# Patient Record
Sex: MALE | Race: OTHER | Employment: OTHER | URBAN - METROPOLITAN AREA
[De-identification: names, ages, dates, MRNs, and addresses within clinical notes are randomized per-mention and may not be internally consistent; named-entity substitution may affect disease eponyms.]

---

## 2019-05-07 ENCOUNTER — OFFICE VISIT (OUTPATIENT)
Dept: FAMILY MEDICINE CLINIC | Age: 74
End: 2019-05-07
Payer: MEDICAID

## 2019-05-07 VITALS
HEIGHT: 69 IN | SYSTOLIC BLOOD PRESSURE: 134 MMHG | WEIGHT: 189.4 LBS | OXYGEN SATURATION: 97 % | HEART RATE: 74 BPM | DIASTOLIC BLOOD PRESSURE: 70 MMHG | RESPIRATION RATE: 14 BRPM | BODY MASS INDEX: 28.05 KG/M2

## 2019-05-07 DIAGNOSIS — G89.29 CHRONIC PAIN OF BOTH KNEES: Primary | ICD-10-CM

## 2019-05-07 DIAGNOSIS — M25.562 CHRONIC PAIN OF BOTH KNEES: Primary | ICD-10-CM

## 2019-05-07 DIAGNOSIS — M25.561 CHRONIC PAIN OF BOTH KNEES: Primary | ICD-10-CM

## 2019-05-07 DIAGNOSIS — N40.0 BENIGN PROSTATIC HYPERPLASIA WITHOUT LOWER URINARY TRACT SYMPTOMS: ICD-10-CM

## 2019-05-07 DIAGNOSIS — R53.83 FATIGUE, UNSPECIFIED TYPE: ICD-10-CM

## 2019-05-07 PROCEDURE — 99204 OFFICE O/P NEW MOD 45 MIN: CPT | Performed by: FAMILY MEDICINE

## 2019-05-07 PROCEDURE — 1036F TOBACCO NON-USER: CPT | Performed by: FAMILY MEDICINE

## 2019-05-07 PROCEDURE — 1123F ACP DISCUSS/DSCN MKR DOCD: CPT | Performed by: FAMILY MEDICINE

## 2019-05-07 PROCEDURE — G8419 CALC BMI OUT NRM PARAM NOF/U: HCPCS | Performed by: FAMILY MEDICINE

## 2019-05-07 PROCEDURE — 3017F COLORECTAL CA SCREEN DOC REV: CPT | Performed by: FAMILY MEDICINE

## 2019-05-07 PROCEDURE — 4040F PNEUMOC VAC/ADMIN/RCVD: CPT | Performed by: FAMILY MEDICINE

## 2019-05-07 PROCEDURE — G8427 DOCREV CUR MEDS BY ELIG CLIN: HCPCS | Performed by: FAMILY MEDICINE

## 2019-05-07 RX ORDER — MELOXICAM 15 MG/1
15 TABLET ORAL DAILY
Qty: 30 TABLET | Refills: 3 | Status: SHIPPED | OUTPATIENT
Start: 2019-05-07

## 2019-05-07 RX ORDER — TAMSULOSIN HYDROCHLORIDE 0.4 MG/1
0.4 CAPSULE ORAL
COMMUNITY
Start: 2019-04-29

## 2019-05-07 RX ORDER — CLONAZEPAM 2 MG/1
2 TABLET ORAL
COMMUNITY
Start: 2019-04-29

## 2019-05-07 RX ORDER — METHYLPREDNISOLONE 4 MG
TABLET, DOSE PACK ORAL
COMMUNITY
Start: 2019-04-29

## 2019-05-07 RX ORDER — IVERMECTIN 10 MG/G
CREAM TOPICAL
COMMUNITY

## 2019-05-07 ASSESSMENT — PATIENT HEALTH QUESTIONNAIRE - PHQ9
SUM OF ALL RESPONSES TO PHQ9 QUESTIONS 1 & 2: 1
1. LITTLE INTEREST OR PLEASURE IN DOING THINGS: 0
SUM OF ALL RESPONSES TO PHQ QUESTIONS 1-9: 1
SUM OF ALL RESPONSES TO PHQ QUESTIONS 1-9: 1
2. FEELING DOWN, DEPRESSED OR HOPELESS: 1

## 2019-05-07 ASSESSMENT — ENCOUNTER SYMPTOMS
EYES NEGATIVE: 1
RESPIRATORY NEGATIVE: 1
GASTROINTESTINAL NEGATIVE: 1

## 2019-05-07 NOTE — PROGRESS NOTES
SUBJECTIVE:  Patient ID: Ramila Buckner is a 68 y.o. y.o. male     HPI   Pt is here to establish, speaks  is present along with his daughter  Knee Pain: Patient presents with knee pain involving the  bilateral knee. Onset of the symptoms was several months ago. Inciting event: none known. Current symptoms include crepitus sensation, giving out and stiffness. Pain is aggravated by any weight bearing. Patient has had no prior knee problems. Evaluation to date: plain films: in [facility] . Treatment to date: avoidance of offending activity. And varicose vain  Fatigue: Patient complains of fatigue. Symptoms began several months ago. Sentinal symptom the patient feels fatigue began with: none. Symptoms of his fatigue have been feelings of depression and general malaise. Patient describes the following psychologic symptoms: none. Patient denies fever, significant change in weight, exercise intolerance, unusual rashes, cold intolerance, constipation and change in hair texture. and witnessed or suspected sleep apnea. Symptoms have been intermittent. Severity has been mild. Previous visits for this problem: none. Was in [facility] was told he has abnormal PSA and was put on Flomax feels better on it    Past Medical History:   Diagnosis Date    Chronic back pain     GERD (gastroesophageal reflux disease)       Past Surgical History:   Procedure Laterality Date    GALLBLADDER SURGERY      HEMORRHOID SURGERY      TONSILLECTOMY      UMBILICAL HERNIA REPAIR       History reviewed. No pertinent family history.   Social History     Socioeconomic History    Marital status:      Spouse name: None    Number of children: None    Years of education: None    Highest education level: None   Occupational History    None   Social Needs    Financial resource strain: None    Food insecurity:     Worry: None     Inability: None    Transportation needs:     Medical: None     Non-medical: None   Tobacco Use icterus. Neck: Normal range of motion. Neck supple. No JVD present. Carotid bruit is not present. No thyromegaly present. Cardiovascular: Normal rate, regular rhythm, normal heart sounds and intact distal pulses. No murmur heard. Pulmonary/Chest: Effort normal and breath sounds normal. No respiratory distress. He has no wheezes. He has no rales. He exhibits no tenderness. Abdominal: Soft. Bowel sounds are normal. He exhibits no distension and no mass. There is no tenderness. There is no rebound and no guarding. Musculoskeletal: He exhibits no edema. Right knee: He exhibits decreased range of motion. Tenderness found. Medial joint line and lateral joint line tenderness noted. Neurological: He is alert and oriented to person, place, and time. Skin: No rash noted. Vitals reviewed. ASSESSMENT:   Diagnosis Orders   1. Chronic pain of both knees  [facility]   2. Benign prostatic hyperplasia without lower urinary tract symptoms  PSA PROSTATIC SPECIFIC ANTIGEN   3.  Fatigue, unspecified type  CBC    Basic Metabolic Panel    TSH without Reflex         PLAN:  See orders   Discussed possible depression, may need to be treated recommend counseling daughter will work on it

## 2019-05-07 NOTE — PATIENT INSTRUCTIONS
Patient Education        Knee Arthritis: Exercises  Your Care Instructions  Here are some examples of exercises for knee arthritis. Start each exercise slowly. Ease off the exercise if you start to have pain. Your doctor or physical therapist will tell you when you can start these exercises and which ones will work best for you. How to do the exercises  Knee flexion with heel slide    1. Lie on your back with your knees bent. 2. Slide your heel back by bending your affected knee as far as you can. Then hook your other foot around your ankle to help pull your heel even farther back. 3. Hold for about 6 seconds, then rest for up to 10 seconds. 4. Repeat 8 to 12 times. 5. Switch legs and repeat steps 1 through 4, even if only one knee is sore. Quad sets    1. Sit with your affected leg straight and supported on the floor or a firm bed. Place a small, rolled-up towel under your knee. Your other leg should be bent, with that foot flat on the floor. 2. Tighten the thigh muscles of your affected leg by pressing the back of your knee down into the towel. 3. Hold for about 6 seconds, then rest for up to 10 seconds. 4. Repeat 8 to 12 times. 5. Switch legs and repeat steps 1 through 4, even if only one knee is sore. Straight-leg raises to the front    1. Lie on your back with your good knee bent so that your foot rests flat on the floor. Your affected leg should be straight. Make sure that your low back has a normal curve. You should be able to slip your hand in between the floor and the small of your back, with your palm touching the floor and your back touching the back of your hand. 2. Tighten the thigh muscles in your affected leg by pressing the back of your knee flat down to the floor. Hold your knee straight. 3. Keeping the thigh muscles tight and your leg straight, lift your affected leg up so that your heel is about 12 inches off the floor. Hold for about 6 seconds, then lower slowly.   4. Relax for up to 10 seconds between repetitions. 5. Repeat 8 to 12 times. 6. Switch legs and repeat steps 1 through 5, even if only one knee is sore. Active knee flexion    1. Lie on your stomach with your knees straight. If your kneecap is uncomfortable, roll up a washcloth and put it under your leg just above your kneecap. 2. Lift the foot of your affected leg by bending the knee so that you bring the foot up toward your buttock. If this motion hurts, try it without bending your knee quite as far. This may help you avoid any painful motion. 3. Slowly move your leg up and down. 4. Repeat 8 to 12 times. 5. Switch legs and repeat steps 1 through 4, even if only one knee is sore. Quadriceps stretch (facedown)    1. Lie flat on your stomach, and rest your face on the floor. 2. Wrap a towel or belt strap around the lower part of your affected leg. Then use the towel or belt strap to slowly pull your heel toward your buttock until you feel a stretch. 3. Hold for about 15 to 30 seconds, then relax your leg against the towel or belt strap. 4. Repeat 2 to 4 times. 5. Switch legs and repeat steps 1 through 4, even if only one knee is sore. Stationary exercise bike    1. If you do not have a stationary exercise bike at home, you can find one to ride at your local health club or community center. 2. Adjust the height of the bike seat so that your knee is slightly bent when your leg is extended downward. If your knee hurts when the pedal reaches the top, you can raise the seat so that your knee does not bend as much. 3. Start slowly. At first, try to do 5 to 10 minutes of cycling with little to no resistance. Then increase your time and the resistance bit by bit until you can do 20 to 30 minutes without pain. 4. If you start to have pain, rest your knee until your pain gets back to the level that is normal for you. Or cycle for less time or with less effort.     Follow-up care is a key part of your treatment and safety. Be sure to make and go to all appointments, and call your doctor if you are having problems. It's also a good idea to know your test results and keep a list of the medicines you take. Where can you learn more? Go to [email or web address].[email or web address] org and sign in to your Verifico account. Enter C159 in the Celleration box to learn more about \"Knee Arthritis: Exercises. \"     If you do not have an account, please click on the \"Sign Up Now\" link. Current as of: September 20, 2018  Content Version: 11.9  © 1158-9030 [employer]ave. Care instructions adapted under license by [facility] ([facility]). If you have questions about a medical condition or this instruction, always ask your healthcare professional. [address] any warranty or liability for your use of this information. Patient Education        Knee: Exercises  Your Care Instructions  Here are some examples of exercises for your knee. Start each exercise slowly. Ease off the exercise if you start to have pain. Your doctor or physical therapist will tell you when you can start these exercises and which ones will work best for you. How to do the exercises  Quad sets    1. Sit with your leg straight and supported on the floor or a firm bed. (If you feel discomfort in the front or back of your knee, place a small towel roll under your knee.)  2. Tighten the muscles on top of your thigh by pressing the back of your knee flat down to the floor. (If you feel discomfort under your kneecap, place a small towel roll under your knee.)  3. Hold for about 6 seconds, then rest for up to 10 seconds. 4. Do 8 to 12 repetitions several times a day. Straight-leg raises to the front    1. Lie on your back with your good knee bent so that your foot rests flat on the floor. Your injured leg should be straight. Make sure that your low back has a normal curve.  You should be able to slip your flat hand in between the floor lift your hips off the floor until your shoulders, hips, and knees are all in a straight line. 3. Hold for about 6 seconds as you continue to breathe normally, and then slowly lower your hips back down to the floor and rest for up to 10 seconds. 4. Do 8 to 12 repetitions. Hamstring curls    1. Lie on your stomach with your knees straight. If your kneecap is uncomfortable, roll up a washcloth and put it under your leg just above your kneecap. 2. Lift the foot of your injured leg by bending the knee so that you bring the foot up toward your buttock. If this motion hurts, try it without bending your knee quite as far. This may help you avoid any painful motion. 3. Slowly lower your leg back to the floor. 4. Do 8 to 12 repetitions. 5. With permission from your doctor or physical therapist, you may also want to add a cuff weight to your ankle (not more than 5 pounds). With weight, you do not have to lift your leg more than 12 inches to get a hamstring workout. Shallow standing knee bends    1. Stand with your hands lightly resting on a counter or chair in front of you. Put your feet shoulder-width apart. 2. Slowly bend your knees so that you squat down like you are going to sit in a chair. Make sure your knees do not go in front of your toes. 3. Lower yourself about 6 inches. Your heels should remain on the floor at all times. 4. Rise slowly to a standing position. Heel raises    1. Stand with your feet a few inches apart, with your hands lightly resting on a counter or chair in front of you. 2. Slowly raise your heels off the floor while keeping your knees straight. 3. Hold for about 6 seconds, then slowly lower your heels to the floor. 4. Do 8 to 12 repetitions several times during the day. Follow-up care is a key part of your treatment and safety. Be sure to make and go to all appointments, and call your doctor if you are having problems.  It's also a good idea to know your test results and keep light, or sleeping in a different bed. It also includes changes in your sleep pattern, such as having jet lag or working a late shift. · Health problems, such as pain, breathing problems, and restless legs syndrome. · Lack of regular exercise. How can you help yourself? Here are some tips that may help you sleep more soundly and wake up feeling more refreshed. Your sleeping area  · Use your bedroom only for sleeping and sex. A bit of light reading may help you fall asleep. But if it doesn't, do your reading elsewhere in the house. Don't watch TV in bed. · Be sure your bed is big enough to stretch out comfortably, especially if you have a sleep partner. · Keep your bedroom quiet, dark, and cool. Use curtains, blinds, or a sleep mask to block out light. To block out noise, use earplugs, soothing music, or a \"white noise\" machine. Your evening and bedtime routine  · Create a relaxing bedtime routine. You might want to take a warm shower or bath, listen to soothing music, or drink a cup of noncaffeinated tea. · Go to bed at the same time every night. And get up at the same time every morning, even if you feel tired. What to avoid  · Limit caffeine (coffee, tea, caffeinated sodas) during the day, and don't have any for at least 4 to 6 hours before bedtime. · Don't drink alcohol before bedtime. Alcohol can cause you to wake up more often during the night. · Don't smoke or use tobacco, especially in the evening. Nicotine can keep you awake. · Don't take naps during the day, especially close to bedtime. · Don't lie in bed awake for too long. If you can't fall asleep, or if you wake up in the middle of the night and can't get back to sleep within 15 minutes or so, get out of bed and go to another room until you feel sleepy. · Don't take medicine right before bed that may keep you awake or make you feel hyper or energized.  Your doctor can tell you if your medicine may do this and if you can take it earlier in the day. If you can't sleep  · Imagine yourself in a peaceful, pleasant scene. Focus on the details and feelings of being in a place that is relaxing. · Get up and do a quiet or boring activity until you feel sleepy. · Don't drink any liquids after 6 p.m. if you wake up often because you have to go to the bathroom. Where can you learn more? Go to https://CyberapeHeadSense MedicalewClasesD.[employer]. org and sign in to your Complix account. Enter N465 in the Nanoledge box to learn more about \"Learning About Sleeping Well. \"     If you do not have an account, please click on the \"Sign Up Now\" link. Current as of: September 11, 2018  Content Version: 11.9  © 3951-1676 [place] [employer], [employer]. Care instructions adapted under license by [facility] ([facility]). If you have questions about a medical condition or this instruction, always ask your healthcare professional. [place] [address] any warranty or liability for your use of this information. Patient Education        Insomnia: Care Instructions  Your Care Instructions    Insomnia is the inability to sleep well. It is a common problem for most people at some time. Insomnia may make it hard for you to get to sleep, stay asleep, or sleep as long as you need to. This can make you tired and grouchy during the day. It can also make you forgetful, less effective at work, and unhappy. Insomnia can be caused by conditions such as depression or anxiety. Pain can also affect your ability to sleep. When these problems are solved, the insomnia usually clears up. But sometimes bad sleep habits can cause insomnia. If insomnia is affecting your work or your enjoyment of life, you can take steps to improve your sleep. Follow-up care is a key part of your treatment and safety. Be sure to make and go to all appointments, and call your doctor if you are having problems. It's also a good idea to know your test results and keep a list of the medicines you take.   How can you care for yourself at home? What to avoid  · Do not have drinks with caffeine, such as coffee or black tea, for 8 hours before bed. · Do not smoke or use other types of tobacco near bedtime. Nicotine is a stimulant and can keep you awake. · Avoid drinking alcohol late in the evening, because it can cause you to wake in the middle of the night. · Do not eat a big meal close to bedtime. If you are hungry, eat a light snack. · Do not drink a lot of water close to bedtime, because the need to urinate may wake you up during the night. · Do not read or watch TV in bed. Use the bed only for sleeping and sexual activity. What to try  · Go to bed at the same time every night, and wake up at the same time every morning. Do not take naps during the day. · Keep your bedroom quiet, dark, and cool. · Sleep on a comfortable pillow and mattress. · If watching the clock makes you anxious, turn it facing away from you so you cannot see the time. · If you worry when you lie down, start a worry book. Well before bedtime, write down your worries, and then set the book and your concerns aside. · Try meditation or other relaxation techniques before you go to bed. · If you cannot fall asleep, get up and go to another room until you feel sleepy. Do something relaxing. Repeat your bedtime routine before you go to bed again. · Make your house quiet and calm about an hour before bedtime. Turn down the lights, turn off the TV, log off the computer, and turn down the volume on music. This can help you relax after a busy day. When should you call for help? Watch closely for changes in your health, and be sure to contact your doctor if:    · Your efforts to improve your sleep do not work.     · Your insomnia gets worse.     · You have been feeling down, depressed, or hopeless or have lost interest in things that you usually enjoy. Where can you learn more? Go to https://cecilia.[facility]. org and sign in to your

## 2019-05-09 ENCOUNTER — OFFICE VISIT (OUTPATIENT)
Dept: ORTHOPEDIC SURGERY | Age: 74
End: 2019-05-09
Payer: MEDICAID

## 2019-05-09 DIAGNOSIS — M17.0 PRIMARY OSTEOARTHRITIS OF BOTH KNEES: ICD-10-CM

## 2019-05-09 DIAGNOSIS — M17.10 OSTEOARTHRITIS OF PATELLOFEMORAL JOINT, UNSPECIFIED LATERALITY: Primary | ICD-10-CM

## 2019-05-09 DIAGNOSIS — M25.561 PAIN IN BOTH KNEES, UNSPECIFIED CHRONICITY: ICD-10-CM

## 2019-05-09 DIAGNOSIS — M25.562 PAIN IN BOTH KNEES, UNSPECIFIED CHRONICITY: ICD-10-CM

## 2019-05-09 PROCEDURE — 4040F PNEUMOC VAC/ADMIN/RCVD: CPT | Performed by: INTERNAL MEDICINE

## 2019-05-09 PROCEDURE — 1123F ACP DISCUSS/DSCN MKR DOCD: CPT | Performed by: INTERNAL MEDICINE

## 2019-05-09 PROCEDURE — G8427 DOCREV CUR MEDS BY ELIG CLIN: HCPCS | Performed by: INTERNAL MEDICINE

## 2019-05-09 PROCEDURE — 3017F COLORECTAL CA SCREEN DOC REV: CPT | Performed by: INTERNAL MEDICINE

## 2019-05-09 PROCEDURE — 99203 OFFICE O/P NEW LOW 30 MIN: CPT | Performed by: INTERNAL MEDICINE

## 2019-05-09 PROCEDURE — 1036F TOBACCO NON-USER: CPT | Performed by: INTERNAL MEDICINE

## 2019-05-09 PROCEDURE — G8419 CALC BMI OUT NRM PARAM NOF/U: HCPCS | Performed by: INTERNAL MEDICINE

## 2019-05-09 NOTE — PROGRESS NOTES
Chief Complaint:   Chief Complaint   Patient presents with    Knee Pain     NEW B KNEE PAIN          History of Present Illness:       Patient is a 68 y.o. male presents with the above complaint. The symptoms began 6 monthsago and started without an injury. The patient describes a aching, stiffness pain that does not radiate. The symptoms are intermittent  and are are worsening since the onset. Noted a general pattern of worsening after some recreational swimming. Workup to date has included outside x-rays in [facility].    He is seen in consultation at the request of Dr.Amal Heber Henderson    Pain localizes to the front side of the knee and  does seems to follow a typical patella femoral provacative pattern. There are not mechanical symptoms that suggest meniscal injury. The patient denies subjective instability about the knee and denies new onset or progressive weakness of the lower extremity. Pain level mild to moderate at most    The patient denies a pattern of activity related swelling. Treatment to date: NSAIDS meloxicam with mild improvement ×1 dose thus far    There is no prior history of knee trauma. There is no prior history of autoimmune disease, crystal arthropathy, or crystal arthropathy. Past Medical History:        Past Medical History:   Diagnosis Date    Chronic back pain     GERD (gastroesophageal reflux disease)          Past Surgical History:   Procedure Laterality Date    GALLBLADDER SURGERY      HEMORRHOID SURGERY      TONSILLECTOMY      UMBILICAL HERNIA REPAIR           Present Medications:         Current Outpatient Medications   Medication Sig Dispense Refill    clonazePAM (KLONOPIN) 2 MG tablet Take 2 mg by mouth.  Glucosamine Sulfate 500 MG TABS Take by mouth      tamsulosin (FLOMAX) 0.4 MG capsule Take 0.4 mg by mouth      metroNIDAZOLE (METROCREAM) 0.75 % cream Apply topically 2 times daily Apply topically 2 times daily.       Ivermectin 1 % CREA Apply topically      meloxicam (MOBIC) 15 MG tablet Take 1 tablet by mouth daily 30 tablet 3     No current facility-administered medications for this visit. Allergies:      No Known Allergies     Social History:         Social History     Socioeconomic History    Marital status:      Spouse name: Not on file    Number of children: Not on file    Years of education: Not on file    Highest education level: Not on file   Occupational History    Not on file   Social Needs    Financial resource strain: Not on file    Food insecurity:     Worry: Not on file     Inability: Not on file    Transportation needs:     Medical: Not on file     Non-medical: Not on file   Tobacco Use    Smoking status: Never Smoker    Smokeless tobacco: Never Used   Substance and Sexual Activity    Alcohol use: Yes    Drug use: Not on file    Sexual activity: Not on file   Lifestyle    Physical activity:     Days per week: Not on file     Minutes per session: Not on file    Stress: Not on file   Relationships    Social connections:     Talks on phone: Not on file     Gets together: Not on file     Attends Restorationism service: Not on file     Active member of club or organization: Not on file     Attends meetings of clubs or organizations: Not on file     Relationship status: Not on file    Intimate partner violence:     Fear of current or ex partner: Not on file     Emotionally abused: Not on file     Physically abused: Not on file     Forced sexual activity: Not on file   Other Topics Concern    Not on file   Social History Narrative    Not on file        Review of Symptoms:    Pertinent items are noted in HPI    Review of systems reviewed from Patient History Form dated on today's date and   available in the patient's chart under the Media tab. Vital Signs: There were no vitals filed for this visit.      General Exam:     Constitutional: Patient is adequately groomed with no evidence of within the quadriceps tendon distribution on the right. The patellofemoral joints are anatomic     Office Procedures:     Orders Placed This Encounter   Procedures    XR KNEE LEFT (1-2 VIEWS)    XR KNEE RIGHT (1-2 VIEWS)           Other Outside Imaging and Testing Personally Reviewed:    No results found. Assessment   Impression: . Encounter Diagnoses   Name Primary?  Osteoarthritis of patellofemoral joint, unspecified laterality Yes    Primary osteoarthritis of both knees     Pain in both knees, unspecified chronicity               Plan:       Continue meloxicam daily with GI precaution  Recommend hyaluronic acid therapy bilateral knees-Synvisc 1 or insurance coverage preferred agent  Formal course of PT-PPP  Activity modification-PPP  Premature to consider him a candidate for TKA/patella arthroplasty    The nature of the finding, probable diagnosis and likely treatment was thoroughly discussed with the patient. The options, risks, complications, alternative treatment as well as some of the differential diagnosis was discussed. The patient was thoroughly informed and all questions were answered. the patient indicated understanding and satisfaction with the discussion. Orders:         Orders Placed This Encounter   Procedures    XR KNEE LEFT (1-2 VIEWS)    XR KNEE RIGHT (1-2 VIEWS)           Disclaimer: \"This note was dictated with voice recognition software. Though review and correction are routine, we apologize for any errors. \"

## 2019-05-10 ENCOUNTER — HOSPITAL ENCOUNTER (OUTPATIENT)
Dept: PHYSICAL THERAPY | Age: 74
Setting detail: THERAPIES SERIES
Discharge: HOME | End: 2019-05-10
Payer: MEDICAID

## 2019-05-10 LAB
A/G RATIO: 1.7 (CALC) (ref 1–2.5)
ALBUMIN SERPL-MCNC: 4 G/DL (ref 3.6–5.1)
ALP BLD-CCNC: 94 U/L (ref 40–115)
ALT SERPL-CCNC: 17 U/L (ref 9–46)
AST SERPL-CCNC: 18 U/L (ref 10–35)
BASOPHILS ABSOLUTE: 62 CELLS/UL (ref 0–200)
BASOPHILS RELATIVE PERCENT: 1.1 %
BILIRUB SERPL-MCNC: 1.1 MG/DL (ref 0.2–1.2)
BILIRUBIN DIRECT: 0.3 MG/DL
BILIRUBIN, INDIRECT: 0.8 MG/DL (CALC) (ref 0.2–1.2)
BUN / CREAT RATIO: NORMAL (CALC) (ref 6–22)
BUN BLDV-MCNC: 12 MG/DL (ref 7–25)
CALCIUM SERPL-MCNC: 9 MG/DL (ref 8.6–10.3)
CHLORIDE BLD-SCNC: 108 MMOL/L (ref 98–110)
CHOLESTEROL, TOTAL: 139 MG/DL
CHOLESTEROL/HDL RATIO: 2.6 (CALC)
CHOLESTEROL: 85 MG/DL (CALC)
CO2: 27 MMOL/L (ref 20–32)
CREAT SERPL-MCNC: 0.95 MG/DL (ref 0.7–1.18)
EOSINOPHILS ABSOLUTE: 112 CELLS/UL (ref 15–500)
EOSINOPHILS RELATIVE PERCENT: 2 %
GFR AFRICAN AMERICAN: 92 ML/MIN/1.73M2
GFR, ESTIMATED: 80 ML/MIN/1.73M2
GLOBULIN: 2.3 G/DL (CALC) (ref 1.9–3.7)
GLUCOSE BLD-MCNC: 89 MG/DL (ref 65–99)
HCT VFR BLD CALC: 47.2 % (ref 38.5–50)
HDLC SERPL-MCNC: 54 MG/DL
HEMOGLOBIN: 15.9 G/DL (ref 13.2–17.1)
LDL CHOLESTEROL CALCULATED: 73 MG/DL (CALC)
LYMPHOCYTES ABSOLUTE: 1366 CELLS/UL (ref 850–3900)
LYMPHOCYTES RELATIVE PERCENT: 24.4 %
MCH RBC QN AUTO: 31.4 PG (ref 27–33)
MCHC RBC AUTO-ENTMCNC: 33.7 G/DL (ref 32–36)
MCV RBC AUTO: 93.3 FL (ref 80–100)
MONOCYTES ABSOLUTE: 431 CELLS/UL (ref 200–950)
MONOCYTES RELATIVE PERCENT: 7.7 %
NEUTROPHILS ABSOLUTE: 3629 CELLS/UL (ref 1500–7800)
PDW BLD-RTO: 12.7 % (ref 11–15)
PLATELET # BLD: 166 THOUSAND/UL (ref 140–400)
PMV BLD AUTO: 10.3 FL (ref 7.5–12.5)
POTASSIUM SERPL-SCNC: 3.9 MMOL/L (ref 3.5–5.3)
PROSTATE SPECIFIC ANTIGEN: 1.1 NG/ML
RBC # BLD: 5.06 MILLION/UL (ref 4.2–5.8)
SEGMENTED NEUTROPHILS RELATIVE PERCENT: 64.8 %
SODIUM BLD-SCNC: 141 MMOL/L (ref 135–146)
TOTAL PROTEIN: 6.3 G/DL (ref 6.1–8.1)
TRIGL SERPL-MCNC: 50 MG/DL
TSH ULTRASENSITIVE: 1.17 MIU/L (ref 0.4–4.5)
WBC # BLD: 5.6 THOUSAND/UL (ref 3.8–10.8)

## 2019-05-10 NOTE — FLOWSHEET NOTE
Physical Therapy  Cancellation/No-show Note  Patient Name:  Varsha Hoskins  :  1945   Date:  5/10/2019  Cancelled visits to date: 1  No-shows to date: 0    For today's appointment patient:  [x]  Cancelled  []  Rescheduled appointment  []  No-show     Reason given by patient:  []  Patient ill  []  Conflicting appointment  []  No transportation    []  Conflict with work  [x]  No reason given  []  Other:     Comments:      Electronically signed by:  Ashlee Brooks PT

## 2019-05-13 ENCOUNTER — TELEPHONE (OUTPATIENT)
Dept: FAMILY MEDICINE CLINIC | Age: 74
End: 2019-05-13

## 2019-05-13 NOTE — TELEPHONE ENCOUNTER
Called Tip Tang (Daughter). Patient had appt last week with  [facility] about knee pain. Still having knee pain, but he wants to see a Vascular surgeon about the varicose veins. He is getting worried about them. Daughter thinks you saw the veins at his appt.

## 2019-05-14 ENCOUNTER — HOSPITAL ENCOUNTER (OUTPATIENT)
Dept: PHYSICAL THERAPY | Age: 74
Setting detail: THERAPIES SERIES
Discharge: HOME | End: 2019-05-14
Payer: MEDICAID

## 2019-05-14 PROCEDURE — 97110 THERAPEUTIC EXERCISES: CPT | Performed by: PHYSICAL THERAPIST

## 2019-05-14 PROCEDURE — 97161 PT EVAL LOW COMPLEX 20 MIN: CPT | Performed by: PHYSICAL THERAPIST

## 2019-05-14 NOTE — TELEPHONE ENCOUNTER
[phone]  Spoke with patients daughter advised per Dr Shanon Borwn does not need to see vascular, but if he wantsto see one, should call insurance. . Daughter verbalized understanding

## 2019-05-14 NOTE — PLAN OF CARE
The [address] [place] [address]  Phone [phone]  Fax [phone]                                                       Physical Therapy Certification    Dear Referring Practitioner: Reji Navarro MD,    We had the pleasure of evaluating the following patient for physical therapy services at [address] [place] [address]. A summary of our findings can be found in the initial assessment below. This includes our plan of care. If you have any questions or concerns regarding these findings, please do not hesitate to contact me at the office phone number checked above.   Thank you for the referral.       Physician Signature:_______________________________Date:__________________  By signing above (or electronic signature), therapists plan is approved by physician      Patient: Treasure Machado   : 1945   MRN: [id number]  Referring Physician: Referring Practitioner: Reji Navarro MD      Evaluation Date: 2019      Medical Diagnosis Information:  Diagnosis: M17.0 (ICD-10-CM) - Primary osteoarthritis of both knees M17.10 (ICD-10-CM) - Osteoarthritis of patellofemoral joint, unspecified laterality   Treatment Diagnosis: M25.561, M25.562 (ICD-10-CM) - Pain in both knees, unspecified chronicity                                         Insurance information: PT Insurance Information: Kisha Mckeon 30soft     Precautions/ Contra-indications:   Latex Allergy:  [x]NO      []YES  Preferred Language for Healthcare:   [x]English       []other:    SUBJECTIVE: Patient stated complaint: low grade pain x 2 years, increased pain 2 mo ago after increased swimming with fins on feet, pain R>L    Relevant Medical History:RA  Functional Disability Index: nex tvisit, time limits with     Pain Scale: 8/10 worst  Easing factors: rest  Provocative factors: standing walking squatting position changes    Type: []Constant   [x]Intermittent  []Radiating [x]Localized []other:     Numbness/Tingling: none    Occupation/School:NA    Living Status/Prior Level of Function: Independent with ADLs and IADLs, adls and swim with low grade c/o prior to 2 mo ago    OBJECTIVE:      Flexibility 5/14 L R Comment   Hamstring Mod restriction Mod restriction    Gastroc Mod restriction Mod restriction    ITB      Quad              ROM 5/14 PROM AROM Overpressure Comment    L R L R L R    Flexion   135 135      Extension   -5 -5                              Strength 5/14 L R Comment   Quad Recruitment fair B     Hamstring      Gastroc      Hip  flexion 4- 4-    Hip abd 4- 4-                    Special Test Results/Comment   Meniscal Click    Crepitus Mild B 5/14         Reflexes/Sensation:    [x]Dermatomes/Myotomes intact 5/14   []Reflexes equal and normal bilaterally   []Other:    Joint mobility: Not assessed 5/14   []Normal    []Hypo   []Hyper    Palpation: not assessed 5/14    Functional Mobility/Transfers: grosslu WFL with mild incrased timing but grossly WFL for 68 yr old male 5/14    Posture: grossly WFL for 68 yr old male 5/14    Bandages/Dressings/Incisions: NA    Gait: (include devices/WB status) grossly WFL for short distances noted in clinic 5/14                         [x] Patient history, allergies, meds reviewed. Medical chart reviewed. See intake form. 5/14    Review Of Systems (ROS):  [x]Performed Review of systems (Integumentary, CardioPulmonary, Neurological) by intake and observation. Intake form has been scanned into medical record. Patient has been instructed to contact their primary care physician regarding ROS issues if not already being addressed at this time.  5/14     Co-morbidities/Complexities (which will affect course of rehabilitation):   []None           Arthritic conditions   [x]Rheumatoid arthritis (M05.9)  []Osteoarthritis (M19.91)   Cardiovascular conditions []Hypertension (I10)  []Hyperlipidemia (E78.5)  []Angina pectoris (I20)  []Atherosclerosis (I70)   Musculoskeletal conditions   []Disc pathology   []Congenital spine pathologies   []Prior surgical intervention  []Osteoporosis (M81.8)  []Osteopenia (M85.8)   Endocrine conditions   []Hypothyroid (E03.9)  []Hyperthyroid Gastrointestinal conditions   []Constipation (H06.86)   Metabolic conditions   []Morbid obesity (E66.01)  []Diabetes type 1(E10.65) or 2 (E11.65)   []Neuropathy (G60.9)     Pulmonary conditions   []Asthma (J45)  []Coughing   []COPD (J44.9)   Psychological Disorders  []Anxiety (F41.9)  []Depression (F32.9)   []Other:   []Other:          Barriers to/and or personal factors that will affect rehab potential:              [x]Age  [x]Sex              [x]Motivation/Lack of Motivation                        [x]Co-Morbidities              []Cognitive Function, education/learning barriers              []Environmental, home barriers              []profession/work barriers  []past PT/medical experience  []other:      Falls Risk Assessment (30 days): assess next visit as able  [] Falls Risk assessed and no intervention required.   [] Falls Risk assessed and Patient requires intervention due to being higher risk   TUG score (>12s at risk):     [] Falls education provided, including       G-Codes:       ASSESSMENT:   Functional Impairments:     []Noted lumbar/proximal hip/LE hypomobility   []Decreased LE functional ROM   [x]Decreased core/proximal hip strength and neuromuscular control   [x]Decreased LE functional strength   []Reduced balance/proprioceptive control   []other:      Functional Activity Limitations (from functional questionnaire and intake)   [x]Reduced ability to tolerate prolonged functional positions   [x]Reduced ability or difficulty with changes of positions or transfers between positions   [x]Reduced ability to maintain good posture and demonstrate good body mechanics with sitting, bending, and lifting   [x]Reduced ability to sleep   [x] Reduced ability or tolerance with driving and/or computer work   [x]Reduced ability to perform lifting, carrying tasks   [x]Reduced ability to squat   [x]Reduced ability to forward bend   [x]Reduced ability to ambulate prolonged functional periods/distances/surfaces   [x]Reduced ability to ascend/descend stairs   [x]Reduced ability to run, hop or jump   []other:     Participation Restrictions   [x]Reduced participation in self care activities   [x]Reduced participation in home management activities   [x]Reduced participation in work activities   [x]Reduced participation in social activities. [x]Reduced participation in sport activities. Classification :    []Signs/symptoms consistent with post-surgical status including decreased ROM, strength and function.    []Signs/symptoms consistent with joint sprain/strain   [x]Signs/symptoms consistent with patella-femoral syndrome   [x]Signs/symptoms consistent with knee OA/hip OA   []Signs/symptoms consistent with internal derangement of knee/Hip   []Signs/symptoms consistent with functional hip weakness/NMR control      []Signs/symptoms consistent with tendinitis/tendinosis    []signs/symptoms consistent with pathology which may benefit from Dry needling      []other:      Prognosis/Rehab Potential:      []Excellent   [x]Good for established goals    []Fair   []Poor    Tolerance of evaluation/treatment:    []Excellent   [x]Good    []Fair   []Poor    Physical Therapy Evaluation Complexity Justification  [x] A history of present problem with:  [] no personal factors and/or comorbidities that impact the plan of care;  [x]1-2 personal factors and/or comorbidities that impact the plan of care  []3 personal factors and/or comorbidities that impact the plan of care  [x] An examination of body systems using standardized tests and measures addressing any of the following: body structures and functions (impairments), activity limitations, and/or participation restrictions;:  [] a total of 1-2 or more elements   [x] a total of 3 or more elements   [] a total of 4 or more elements   [x] A clinical presentation with:  [x] stable and/or uncomplicated characteristics   [] evolving clinical presentation with changing characteristics  [] unstable and unpredictable characteristics;   [x] Clinical decision making of [x] low, [] moderate, [] high complexity using standardized patient assessment instrument and/or measurable assessment of functional outcome. [x] EVAL (LOW) 53400 (typically 20 minutes face-to-face)  [] EVAL (MOD) 97515 (typically 30 minutes face-to-face)  [] EVAL (HIGH) 90403 (typically 45 minutes face-to-face)  [] RE-EVAL       PLAN  Frequency/Duration:  1-2 days per week for 4 Weeks:5/14-6/14  Interventions:  [x]  Therapeutic exercise including: strength training, ROM, for Lower extremity and core   [x]  NMR activation and proprioception for LE, Glutes and Core   []  Manual therapy as indicated for LE, Hip and spine to include: Dry Needling/IASTM, STM, PROM, Gr I-IV mobilizations, manipulation. [x] Modalities as needed that may include: thermal agents, E-stim, Biofeedback, US, iontophoresis as indicated  [x] Patient education on joint protection, postural re-education, activity modification, progression of HEP. HEP instruction: (see scanned forms)    GOALS:  Patient stated goal: no pain with adls  Therapist goals for Patient:   Short Term Goals: To be achieved in: 2-4 weeks  1. Independent in HEP and progression per patient tolerance, in order to prevent re-injury. 2. Patient will have a decrease in pain to facilitate improvement in movement, function, and ADLs as indicated by Functional Deficits. Long Term Goals: To be achieved in: 8-12 weeks  1. Disability index score of 25% or less for the [facility] to assist with reaching prior level of function.      2. Patient will demonstrate an increase in Strength to good proximal hip strength and control in LE to allow for proper functional mobility as indicated by patients Functional Deficits.    3. Patient will return to functional activities( adls ) without increased symptoms        Electronically signed by:  Saroj Jones PT

## 2019-05-14 NOTE — FLOWSHEET NOTE
[address] [facility], [facility] [address] [place] [address], [address] [place] [address]  Phone: [phone]   Fax:     [phone]    Physical Therapy Daily Treatment Note  Date:  2019    Patient Name:  Stan Dickson    :  1945  MRN: [id number]  Restrictions/Precautions:    Medical/Treatment Diagnosis Information:  Diagnosis: M17.0 (ICD-10-CM) - Primary osteoarthritis of both knees M17.10 (ICD-10-CM) - Osteoarthritis of patellofemoral joint, unspecified laterality  Treatment Diagnosis: M25.561, M25.562 (ICD-10-CM) - Pain in both knees, unspecified chronicity  Insurance/Certification information:  PT Insurance Information: David 30soft  Physician Information:  Referring Practitioner: Chriss Lambert MD  Plan of care signed (Y/N):     Date of Patient follow up with Physician:     Functional scale WOMAC       Progress Note: []  Yes  []  No  Next due by: Visit #10 or week of       Latex Allergy:  [x]NO      []YES  Preferred Language for Healthcare:   []English       [x]other: present    Visit # Insurance Allowable   1  WOMAC TUG  30 soft     Pain level:  8/10 worst      SUBJECTIVE:   See eval    OBJECTIVE:      Flexibility  L R Comment   Hamstring Mod restriction Mod restriction     Gastroc Mod restriction Mod restriction     ITB         Quad                                ROM  PROM AROM Overpressure Comment     L R L R L R     Flexion     135 135         Extension     -5 -5                                                   Strength  L R Comment   Quad Recruitment fair B       Hamstring         Gastroc         Hip  flexion 4- 4-     Hip abd 4- 4-                               Special Test Results/Comment   Meniscal Click     Crepitus Mild B             Reflexes/Sensation:               [x]Dermatomes/Myotomes intact               []Reflexes equal and normal bilaterally []Other:     Joint mobility: Not assessed 5/14              []Normal               []Hypo              []Hyper     Palpation: not assessed 5/14     Functional Mobility/Transfers: grosslu WFL with mild incrased timing but grossly WFL for 68 yr old male 5/14     Posture: grossly WFL for 68 yr old male 5/14     Bandages/Dressings/Incisions: NA     Gait: (include devices/WB status) grossly WFL for short distances noted in clinic 5/14                          [x] Patient history, allergies, meds reviewed. Medical chart reviewed. See intake form. 5/14     Review Of Systems (ROS):  [x]Performed Review of systems (Integumentary, CardioPulmonary, Neurological) by intake and observation. Intake form has been scanned into medical record. Patient has been instructed to contact their primary care physician regarding ROS issues if not already being addressed at this time.  5/14          RESTRICTIONS/PRECAUTIONS:     Exercises/Interventions:   Exercise/Equipment Resistance/Repetitions Other comments   Stretching     Hamstring [id number]    Towel Pull [id number]    Inclined Calf     Hip Flexion     ITB     Groin     Quad                    SLR     Supine 1x10    Abduction 1x10    Adduction     Prone     SLR+          Isometrics     Quad sets [id number]    BS for adduc [id number]    Patellar Glides     Medial     Superior     Inferior          ROM     Sheet Pulls     Hang Weights     Passive     Active     Weight Shift     Ankle Pumps                    CKC     Calf raises     Wall sits     Step ups     1 leg stand     Squatting     CC TKE     Balance     bridges          PRE     Extension  RANGE:   Flexion  RANGE:        Quantum machines     Leg press      Leg extension     Leg curl          Manual interventions                     Therapeutic Exercise and NMR EXR  [x] (80962) Provided verbal/tactile cueing for activities related to strengthening, flexibility, endurance, ROM for improvements in LE, proximal hip, and core control with self care, mobility, lifting, ambulation.  [] (96339) Provided verbal/tactile cueing for activities related to improving balance, coordination, kinesthetic sense, posture, motor skill, proprioception  to assist with LE, proximal hip, and core control in self care, mobility, lifting, ambulation and eccentric single leg control. NMR and Therapeutic Activities:    [] (70955 or 15756) Provided verbal/tactile cueing for activities related to improving balance, coordination, kinesthetic sense, posture, motor skill, proprioception and motor activation to allow for proper function of core, proximal hip and LE with self care and ADLs  [] (90371) Gait Re-education- Provided training and instruction to the patient for proper LE, core and proximal hip recruitment and positioning and eccentric body weight control with ambulation re-education including up and down stairs     Home Exercise Program:    [x] (25615) Reviewed/Progressed HEP activities related to strengthening, flexibility, endurance, ROM of core, proximal hip and LE for functional self-care, mobility, lifting and ambulation/stair navigation   [] (33146)Reviewed/Progressed HEP activities related to improving balance, coordination, kinesthetic sense, posture, motor skill, proprioception of core, proximal hip and LE for self care, mobility, lifting, and ambulation/stair navigation      Manual Treatments:  PROM / STM / Oscillations-Mobs:  G-I, II, III, IV (PA's, Inf., Post.)  [] (22617) Provided manual therapy to mobilize LE, proximal hip and/or LS spine soft tissue/joints for the purpose of modulating pain, promoting relaxation,  increasing ROM, reducing/eliminating soft tissue swelling/inflammation/restriction, improving soft tissue extensibility and allowing for proper ROM for normal function with self care, mobility, lifting and ambulation.      Modalities:      Charges:  Timed Code Treatment Minutes: 15   Total Treatment Minutes: 25 time limited due to  time

## 2019-05-16 ENCOUNTER — HOSPITAL ENCOUNTER (OUTPATIENT)
Dept: PHYSICAL THERAPY | Age: 74
Setting detail: THERAPIES SERIES
Discharge: HOME | End: 2019-05-16
Payer: MEDICAID

## 2019-05-16 PROCEDURE — 97110 THERAPEUTIC EXERCISES: CPT | Performed by: PHYSICAL THERAPIST

## 2019-05-16 NOTE — FLOWSHEET NOTE
[address] [facility], [facility] [address] [place] [address], [address] [place] [address]  Phone: [phone]   Fax:     [phone]    Physical Therapy Daily Treatment Note  Date:  2019    Patient Name:  Glendy Arnett    :  1945  MRN: [id number]  Restrictions/Precautions:    Medical/Treatment Diagnosis Information:  Diagnosis: M17.0 (ICD-10-CM) - Primary osteoarthritis of both knees M17.10 (ICD-10-CM) - Osteoarthritis of patellofemoral joint, unspecified laterality  Treatment Diagnosis: M25.561, M25.562 (ICD-10-CM) - Pain in both knees, unspecified chronicity  Insurance/Certification information:  PT Insurance Information: Lamonte 30soft  Physician Information:  Referring Practitioner: Leti Webb MD  Plan of care signed (Y/N):     Date of Patient follow up with Physician:     Functional scale WOMAC 58%        Progress Note: []  Yes  []  No  Next due by: Visit #10 or week of       Latex Allergy:  [x]NO      []YES  Preferred Language for Healthcare:   []English       [x]other: present    Visit # Insurance Allowable   2     30 soft     Pain level:  8/10 worst      SUBJECTIVE:   feel better    OBJECTIVE:      Flexibility  L R Comment   Hamstring Mod restriction Mod restriction     Gastroc Mod restriction Mod restriction     ITB         Quad                                ROM  PROM AROM Overpressure Comment     L R L R L R     Flexion     135 135         Extension     -5 -5                                                   Strength  L R Comment   Quad Recruitment fair B       Hamstring         Gastroc         Hip  flexion 4- 4-     Hip abd 4- 4-                               Special Test Results/Comment   Meniscal Click     Crepitus Mild B             Reflexes/Sensation:               [x]Dermatomes/Myotomes intact               []Reflexes equal and normal bilaterally []Other:     Joint mobility: Not assessed 5/14              []Normal               []Hypo              []Hyper     Palpation: not assessed 5/14     Functional Mobility/Transfers: grosslu WFL with mild incrased timing but grossly WFL for 68 yr old male 5/14     Posture: grossly WFL for 68 yr old male 5/14     Bandages/Dressings/Incisions: NA     Gait: (include devices/WB status) grossly WFL for short distances noted in clinic 5/14                          [x] Patient history, allergies, meds reviewed. Medical chart reviewed. See intake form. 5/14     Review Of Systems (ROS):  [x]Performed Review of systems (Integumentary, CardioPulmonary, Neurological) by intake and observation. Intake form has been scanned into medical record. Patient has been instructed to contact their primary care physician regarding ROS issues if not already being addressed at this time.  5/14          RESTRICTIONS/PRECAUTIONS:     Exercises/Interventions:   Exercise/Equipment Resistance/Repetitions Other comments   Stretching     Hamstring [id number]    Towel Pull [id number]    Inclined Calf     Hip Flexion     ITB     Groin     Quad                    SLR     Supine 3x10    Abduction 3x10    Adduction     Prone     SLR+          Isometrics     Quad sets [id number]    BS for adduc [id number]    Patellar Glides     Medial     Superior     Inferior          ROM     Sheet Pulls     Hang Weights     Passive     Active     Weight Shift     Ankle Pumps                    CKC     Calf raises 3x10  start5/16   Wall sits     Step ups     1 leg stand     Squatting     CC TKE     Balance     bridges 3x10 start5/16        PRE     Extension  RANGE:   Flexion  RANGE:        Quantum machines     Leg press      Leg extension     Leg curl          Manual interventions                     Therapeutic Exercise and NMR EXR  [x] (86144) Provided verbal/tactile cueing for activities related to strengthening, flexibility, endurance, ROM for improvements in LE, proximal hip, and core control with self care, mobility, lifting, ambulation.  [] (07233) Provided verbal/tactile cueing for activities related to improving balance, coordination, kinesthetic sense, posture, motor skill, proprioception  to assist with LE, proximal hip, and core control in self care, mobility, lifting, ambulation and eccentric single leg control. NMR and Therapeutic Activities:    [] (58161 or 69096) Provided verbal/tactile cueing for activities related to improving balance, coordination, kinesthetic sense, posture, motor skill, proprioception and motor activation to allow for proper function of core, proximal hip and LE with self care and ADLs  [] (20493) Gait Re-education- Provided training and instruction to the patient for proper LE, core and proximal hip recruitment and positioning and eccentric body weight control with ambulation re-education including up and down stairs     Home Exercise Program:    [x] (19941) Reviewed/Progressed HEP activities related to strengthening, flexibility, endurance, ROM of core, proximal hip and LE for functional self-care, mobility, lifting and ambulation/stair navigation   [] (00658)Reviewed/Progressed HEP activities related to improving balance, coordination, kinesthetic sense, posture, motor skill, proprioception of core, proximal hip and LE for self care, mobility, lifting, and ambulation/stair navigation      Manual Treatments:  PROM / STM / Oscillations-Mobs:  G-I, II, III, IV (PA's, Inf., Post.)  [] (09179) Provided manual therapy to mobilize LE, proximal hip and/or LS spine soft tissue/joints for the purpose of modulating pain, promoting relaxation,  increasing ROM, reducing/eliminating soft tissue swelling/inflammation/restriction, improving soft tissue extensibility and allowing for proper ROM for normal function with self care, mobility, lifting and ambulation.      Modalities:      Charges:  Timed Code Treatment Minutes: 35   Total Treatment Minutes: 4978-4521 [] EVAL (LOW) 52247 (typically 20 minutes face-to-face)  [] EVAL (MOD) 48583 (typically 30 minutes face-to-face)  [] EVAL (HIGH) 35883 (typically 45 minutes face-to-face)  [] RE-EVAL   [x] VD(14966) x  2   [] IONTO  [] NMR (55192) x      [] VASO  [] Manual (65344) x       [] Other:  [] TA x       [] Mech Traction (08395)  [] ES(attended) (25253)      [] ES (un) (65048):     GOALS:   Patient stated goal: no pain with adls  Therapist goals for Patient:   Short Term Goals: To be achieved in: 2-4 weeks  1. Independent in HEP and progression per patient tolerance, in order to prevent re-injury. 2. Patient will have a decrease in pain to facilitate improvement in movement, function, and ADLs as indicated by Functional Deficits.     Long Term Goals: To be achieved in: 8-12 weeks  1. Disability index score of 25% or less for the [facility] to assist with reaching prior level of function.      2. Patient will demonstrate an increase in Strength to good proximal hip strength and control in LE to allow for proper functional mobility as indicated by patients Functional Deficits. 3. Patient will return to functional activities( adls ) without increased symptoms      Progression Towards Functional goals:  [] Patient is progressing as expected towards functional goals listed. [] Progression is slowed due to complexities listed. [] Progression has been slowed due to co-morbidities. [x] Plan just implemented, too soon to assess goals progression  [] Other:     ASSESSMENT:  See eval    Treatment/Activity Tolerance:  [x] Patient tolerated treatment well [] Patient limited by fatique  [] Patient limited by pain  [] Patient limited by other medical complications  [x] Other:   present. Pt needed freq verbal sues to do the appropriate reps and sets of exercise given. 5/16    Patient education: 5/14 Reviewed diagnosis, POC, HEP and its importance.      Prognosis: [x] Good for established goals  [] Fair  [] Poor    Patient Requires Follow-up: [x] Yes  [] No    PLAN: 1-2 days per week for 4 Weeks:5/14-6/14      [x] Continue per plan of care [] Alter current plan (see comments)  [] Plan of care initiated [] Hold pending MD visit [] Discharge    Electronically signed by: Melissa Melendez PT,     *If patient does not return for further follow ups after this date. Please consider this as the patients discharge from physical therapy.

## 2019-05-20 ENCOUNTER — TELEPHONE (OUTPATIENT)
Dept: ORTHOPEDIC SURGERY | Age: 74
End: 2019-05-20

## 2019-05-21 ENCOUNTER — HOSPITAL ENCOUNTER (OUTPATIENT)
Dept: PHYSICAL THERAPY | Age: 74
Setting detail: THERAPIES SERIES
Discharge: HOME | End: 2019-05-21
Payer: MEDICAID

## 2019-05-21 PROCEDURE — 97110 THERAPEUTIC EXERCISES: CPT | Performed by: PHYSICAL THERAPIST

## 2019-05-21 NOTE — FLOWSHEET NOTE
[address] [facility], [address] [place] [address], [address] [place] [address]  Phone: [phone]   Fax:     [phone]    Physical Therapy Daily Treatment Note  Date:  2019    Patient Name:  Jean Carlos Fernandez    :  1945  MRN: [id number]  Restrictions/Precautions:    Medical/Treatment Diagnosis Information:  Diagnosis: M17.0 (ICD-10-CM) - Primary osteoarthritis of both knees M17.10 (ICD-10-CM) - Osteoarthritis of patellofemoral joint, unspecified laterality  Treatment Diagnosis: M25.561, M25.562 (ICD-10-CM) - Pain in both knees, unspecified chronicity  Insurance/Certification information:  PT Insurance Information: [id number]  Physician Information:  Referring Practitioner: Carina Molina MD  Plan of care signed (Y/N):     Date of Patient follow up with Physician:     Functional scale WOMAC 58%        Progress Note: []  Yes  []  No  Next due by: Visit #10 or week of       Latex Allergy:  [x]NO      []YES  Preferred Language for Healthcare:   []English       [x]other: present    Visit # Insurance Allowable   3     30 soft     Pain level:  8/10 worst      SUBJECTIVE:   feel better    OBJECTIVE:      Flexibility  L R Comment   Hamstring Mod restriction Mod restriction     Gastroc Mod restriction Mod restriction     ITB         Quad                                ROM  PROM AROM Overpressure Comment     L R L R L R     Flexion     135 135         Extension     -5 -5                                                   Strength  L R Comment   Quad Recruitment fair B       Hamstring         Gastroc         Hip  flexion 4- 4-     Hip abd 4- 4-                               Special Test Results/Comment   Meniscal Click     Crepitus Mild B             Reflexes/Sensation:               [x]Dermatomes/Myotomes intact               []Reflexes equal and normal bilaterally []Other:     Joint mobility: Not assessed 5/14              []Normal               []Hypo              []Hyper     Palpation: not assessed 5/14     Functional Mobility/Transfers: grosslu WFL with mild incrased timing but grossly WFL for 68 yr old male 5/14     Posture: grossly WFL for 68 yr old male 5/14     Bandages/Dressings/Incisions: NA     Gait: (include devices/WB status) grossly WFL for short distances noted in clinic 5/14                          [x] Patient history, allergies, meds reviewed. Medical chart reviewed. See intake form. 5/14     Review Of Systems (ROS):  [x]Performed Review of systems (Integumentary, CardioPulmonary, Neurological) by intake and observation. Intake form has been scanned into medical record. Patient has been instructed to contact their primary care physician regarding ROS issues if not already being addressed at this time.  5/14          RESTRICTIONS/PRECAUTIONS:     Exercises/Interventions:   Exercise/Equipment Resistance/Repetitions Other comments   Stretching     Hamstring    Towel Pull    Inclined Calf     Hip Flexion     ITB     Groin     Quad                    SLR     Supine    Abduction    Adduction     SAQ 3x10 start5/21   clams 3x10 royal loop start5/21        Isometrics     Quad sets    BS for adduc    Patellar Glides     Medial     Superior     Inferior          ROM     Sheet Pulls     Hang Weights     Passive     Active     Weight Shift     Ankle Pumps                    CKC     Calf raises  start5/16   Sit to stand x10 start5/21   Side Step  2 laps royal loop start5/21   1 leg stand     Squatting     CC TKE     Balance     bridges  start5/16        PRE     Extension  RANGE:   Flexion  RANGE:        Quantum machines     Leg press      Leg extension     Leg curl          Manual interventions                     Therapeutic Exercise and NMR EXR  [x] (88175) Provided verbal/tactile cueing for activities related to strengthening, flexibility, endurance, ROM for improvements in LE, proximal hip, and core control with self care, mobility, lifting, ambulation.  [] (20135) Provided verbal/tactile cueing for activities related to improving balance, coordination, kinesthetic sense, posture, motor skill, proprioception  to assist with LE, proximal hip, and core control in self care, mobility, lifting, ambulation and eccentric single leg control. NMR and Therapeutic Activities:    [] (33029 or 51607) Provided verbal/tactile cueing for activities related to improving balance, coordination, kinesthetic sense, posture, motor skill, proprioception and motor activation to allow for proper function of core, proximal hip and LE with self care and ADLs  [] (76792) Gait Re-education- Provided training and instruction to the patient for proper LE, core and proximal hip recruitment and positioning and eccentric body weight control with ambulation re-education including up and down stairs     Home Exercise Program:    [x] (07554) Reviewed/Progressed HEP activities related to strengthening, flexibility, endurance, ROM of core, proximal hip and LE for functional self-care, mobility, lifting and ambulation/stair navigation   [] (03319)Reviewed/Progressed HEP activities related to improving balance, coordination, kinesthetic sense, posture, motor skill, proprioception of core, proximal hip and LE for self care, mobility, lifting, and ambulation/stair navigation      Manual Treatments:  PROM / STM / Oscillations-Mobs:  G-I, II, III, IV (PA's, Inf., Post.)  [] (88763) Provided manual therapy to mobilize LE, proximal hip and/or LS spine soft tissue/joints for the purpose of modulating pain, promoting relaxation,  increasing ROM, reducing/eliminating soft tissue swelling/inflammation/restriction, improving soft tissue extensibility and allowing for proper ROM for normal function with self care, mobility, lifting and ambulation.      Modalities:      Charges:  Timed Code Treatment Minutes: 40 Total Treatment Minutes: 6702-5508       [] EVAL (LOW) 72923 (typically 20 minutes face-to-face)  [] EVAL (MOD) 57313 (typically 30 minutes face-to-face)  [] EVAL (HIGH) 87989 (typically 45 minutes face-to-face)  [] RE-EVAL   [x] TQ(41295) x  3   [] IONTO  [] NMR (95304) x      [] VASO  [] Manual (48740) x       [] Other:  [] TA x       [] Mech Traction (58942)  [] ES(attended) (68821)      [] ES (un) (76558):     GOALS:   Patient stated goal: no pain with adls  Therapist goals for Patient:   Short Term Goals: To be achieved in: 2-4 weeks  1. Independent in HEP and progression per patient tolerance, in order to prevent re-injury. 2. Patient will have a decrease in pain to facilitate improvement in movement, function, and ADLs as indicated by Functional Deficits.     Long Term Goals: To be achieved in: 8-12 weeks  1. Disability index score of 25% or less for the [facility] to assist with reaching prior level of function.      2. Patient will demonstrate an increase in Strength to good proximal hip strength and control in LE to allow for proper functional mobility as indicated by patients Functional Deficits. 3. Patient will return to functional activities( adls ) without increased symptoms      Progression Towards Functional goals:  [] Patient is progressing as expected towards functional goals listed. [] Progression is slowed due to complexities listed. [] Progression has been slowed due to co-morbidities. [x] Plan just implemented, too soon to assess goals progression  [] Other:     ASSESSMENT:  See eval    Treatment/Activity Tolerance:  [x] Patient tolerated treatment well [] Patient limited by fatique  [] Patient limited by pain  [] Patient limited by other medical complications  [x] Other:   present. Spent extended time reviewing home program, walking 5 -7 min out and back and POC moving forward.   5/21    Patient education: 5/14 Reviewed diagnosis, POC, HEP and its importance.      Prognosis: [x] Good for established goals  [] Fair  [] Poor    Patient Requires Follow-up: [x] Yes  [] No    PLAN: 1-2 days per week for 4 Weeks:5/14-6/14      [x] Continue per plan of care [] Alter current plan (see comments)  [] Plan of care initiated [] Hold pending MD visit [] Discharge    Electronically signed by: Holly Laughlin PT,     *If patient does not return for further follow ups after this date. Please consider this as the patients discharge from physical therapy.

## 2019-05-22 ENCOUNTER — APPOINTMENT (OUTPATIENT)
Dept: PHYSICAL THERAPY | Age: 74
End: 2019-05-22
Payer: MEDICAID

## 2019-05-23 ENCOUNTER — HOSPITAL ENCOUNTER (OUTPATIENT)
Dept: PHYSICAL THERAPY | Age: 74
Setting detail: THERAPIES SERIES
Discharge: HOME | End: 2019-05-23
Payer: MEDICAID

## 2019-05-23 PROCEDURE — 97110 THERAPEUTIC EXERCISES: CPT | Performed by: PHYSICAL THERAPIST

## 2019-05-23 NOTE — FLOWSHEET NOTE
[]Reflexes equal and normal bilaterally              []Other:     Joint mobility: Not assessed 5/14              []Normal               []Hypo              []Hyper     Palpation: not assessed 5/14     Functional Mobility/Transfers: grosslu WFL with mild incrased timing but grossly WFL for 68 yr old male 5/14     Posture: grossly WFL for 68 yr old male 5/14     Bandages/Dressings/Incisions: NA     Gait: (include devices/WB status) grossly WFL for short distances noted in clinic 5/14                          [x] Patient history, allergies, meds reviewed. Medical chart reviewed. See intake form. 5/14     Review Of Systems (ROS):  [x]Performed Review of systems (Integumentary, CardioPulmonary, Neurological) by intake and observation. Intake form has been scanned into medical record. Patient has been instructed to contact their primary care physician regarding ROS issues if not already being addressed at this time.  5/14          RESTRICTIONS/PRECAUTIONS:     Exercises/Interventions:   Exercise/Equipment Resistance/Repetitions Other comments   recum bike  5 min start5/23   Stretching     Hamstring [id number]    Towel Pull [id number]    Inclined Calf     Hip Flexion     ITB     Groin     Quad                    SLR     Supine 3x10 1.5# ^5/23   Abduction 3x10 1.5# ^5/23   Adduction     SAQ  Stopped due to crepitus   clams 3x10 royal loop SL  3x10 royal loop B in sitting Start5/21  start5/23        Isometrics     Quad sets [id number]    BS for adduc [id number]    Patellar Glides     Medial     Superior     Inferior          ROM     Sheet Pulls     Hang Weights     Passive     Active     Weight Shift     Ankle Pumps                    CKC     Calf raises 3x10  start5/16   Sit to stand x10 no UE start5/21   Side Step  3 laps royal loop at sink counter start5/21   1 leg stand     Squatting     CC TKE     Balance     bridges 3x10 start5/16        PRE     Extension  RANGE:   Flexion  RANGE:        Quantum machines     Leg press Leg extension     Leg curl          Manual interventions                     Therapeutic Exercise and NMR EXR  [x] (92953) Provided verbal/tactile cueing for activities related to strengthening, flexibility, endurance, ROM for improvements in LE, proximal hip, and core control with self care, mobility, lifting, ambulation.  [] (95936) Provided verbal/tactile cueing for activities related to improving balance, coordination, kinesthetic sense, posture, motor skill, proprioception  to assist with LE, proximal hip, and core control in self care, mobility, lifting, ambulation and eccentric single leg control.      NMR and Therapeutic Activities:    [] (29086 or 47206) Provided verbal/tactile cueing for activities related to improving balance, coordination, kinesthetic sense, posture, motor skill, proprioception and motor activation to allow for proper function of core, proximal hip and LE with self care and ADLs  [] (83430) Gait Re-education- Provided training and instruction to the patient for proper LE, core and proximal hip recruitment and positioning and eccentric body weight control with ambulation re-education including up and down stairs     Home Exercise Program:    [x] (38009) Reviewed/Progressed HEP activities related to strengthening, flexibility, endurance, ROM of core, proximal hip and LE for functional self-care, mobility, lifting and ambulation/stair navigation   [] (05141)Reviewed/Progressed HEP activities related to improving balance, coordination, kinesthetic sense, posture, motor skill, proprioception of core, proximal hip and LE for self care, mobility, lifting, and ambulation/stair navigation      Manual Treatments:  PROM / STM / Oscillations-Mobs:  G-I, II, III, IV (PA's, Inf., Post.)  [] (06269) Provided manual therapy to mobilize LE, proximal hip and/or LS spine soft tissue/joints for the purpose of modulating pain, promoting relaxation,  increasing ROM, reducing/eliminating soft tissue swelling/inflammation/restriction, improving soft tissue extensibility and allowing for proper ROM for normal function with self care, mobility, lifting and ambulation. Modalities:      Charges:  Timed Code Treatment Minutes: 40   Total Treatment Minutes: 0510-7750       [] EVAL (LOW) 13118 (typically 20 minutes face-to-face)  [] EVAL (MOD) 67132 (typically 30 minutes face-to-face)  [] EVAL (HIGH) 83393 (typically 45 minutes face-to-face)  [] RE-EVAL   [x] WK(14862) x  3   [] IONTO  [] NMR (12390) x      [] VASO  [] Manual (51093) x       [] Other:  [] TA x       [] Mech Traction (90521)  [] ES(attended) (12125)      [] ES (un) (57096):     GOALS:   Patient stated goal: no pain with adls  Therapist goals for Patient:   Short Term Goals: To be achieved in: 2-4 weeks  1. Independent in HEP and progression per patient tolerance, in order to prevent re-injury. 2. Patient will have a decrease in pain to facilitate improvement in movement, function, and ADLs as indicated by Functional Deficits.     Long Term Goals: To be achieved in: 8-12 weeks  1. Disability index score of 25% or less for the [facility] to assist with reaching prior level of function.      2. Patient will demonstrate an increase in Strength to good proximal hip strength and control in LE to allow for proper functional mobility as indicated by patients Functional Deficits. 3. Patient will return to functional activities( adls ) without increased symptoms      Progression Towards Functional goals:  [] Patient is progressing as expected towards functional goals listed. [] Progression is slowed due to complexities listed. [] Progression has been slowed due to co-morbidities.   [x] Plan just implemented, too soon to assess goals progression  [] Other:     ASSESSMENT:  See eval    Treatment/Activity Tolerance:  [x] Patient tolerated treatment well [] Patient limited by fatique  [] Patient limited by pain  [] Patient limited by other medical complications  [x] Other:   present. Need to continue to increase resistance to PRES to increase LE strength      Patient education: 5/14 Reviewed diagnosis, POC, HEP and its importance. Prognosis: [x] Good for established goals  [] Fair  [] Poor    Patient Requires Follow-up: [x] Yes  [] No    PLAN: 1-2 days per week for 4 Weeks:5/14-6/14      [x] Continue per plan of care [] Alter current plan (see comments)  [] Plan of care initiated [] Hold pending MD visit [] Discharge    Electronically signed by: Jeff Bae PT,     *If patient does not return for further follow ups after this date. Please consider this as the patients discharge from physical therapy.

## 2019-05-28 ENCOUNTER — HOSPITAL ENCOUNTER (OUTPATIENT)
Dept: PHYSICAL THERAPY | Age: 74
Setting detail: THERAPIES SERIES
Discharge: HOME | End: 2019-05-28
Payer: MEDICAID

## 2019-05-28 PROCEDURE — 97110 THERAPEUTIC EXERCISES: CPT

## 2019-05-28 NOTE — FLOWSHEET NOTE
The [address] [place] [address],[address], [address], [address] [place] [address]  Phone: [phone]   Fax:     [phone]    Physical Therapy Daily Treatment Note  Date:  2019    Patient Name:  Wilber Romberg    :  1945  MRN: [id number]  Restrictions/Precautions:    Medical/Treatment Diagnosis Information:  Diagnosis: M17.0 (ICD-10-CM) - Primary osteoarthritis of both knees M17.10 (ICD-10-CM) - Osteoarthritis of patellofemoral joint, unspecified laterality  Treatment Diagnosis: M25.561, M25.562 (ICD-10-CM) - Pain in both knees, unspecified chronicity  Insurance/Certification information:  PT Insurance Information: [employer] 30soft  Physician Information:  Referring Practitioner: Adnrez Brooks MD  Plan of care signed (Y/N):     Date of Patient follow up with Physician:     Functional scale WOMAC 58%        Progress Note: []  Yes  []  No  Next due by: Visit #10 or week of       Latex Allergy:  [x]NO      []YES  Preferred Language for Healthcare:   []English       [x]other: present    Visit # Insurance Allowable   5     30 soft     Pain level:  8/10 worst      SUBJECTIVE:   pt attempted SAQ last visit and experienced R medial knee pain. Pt states that he stopped doing his home exercises for 2 days d/t R medial knee pain which helped symptoms decrease.     OBJECTIVE:      Flexibility  L R Comment   Hamstring Mod restriction Mod restriction     Gastroc Mod restriction Mod restriction     ITB         Quad                                ROM  PROM AROM Overpressure Comment     L R L R L R     Flexion     135 135         Extension     -5 -5                                                   Strength  L R Comment   Quad Recruitment fair B       Hamstring         Gastroc         Hip  flexion 4- 4-     Hip abd 4- 4-                               Special Test Results/Comment   Meniscal Click   PRE     Extension  RANGE:   Flexion  RANGE:        Quantum machines     Leg press      Leg extension     Leg curl          Manual interventions                     Therapeutic Exercise and NMR EXR  [x] (67626) Provided verbal/tactile cueing for activities related to strengthening, flexibility, endurance, ROM for improvements in LE, proximal hip, and core control with self care, mobility, lifting, ambulation.  [] (53509) Provided verbal/tactile cueing for activities related to improving balance, coordination, kinesthetic sense, posture, motor skill, proprioception  to assist with LE, proximal hip, and core control in self care, mobility, lifting, ambulation and eccentric single leg control.      NMR and Therapeutic Activities:    [] (48928 or 13175) Provided verbal/tactile cueing for activities related to improving balance, coordination, kinesthetic sense, posture, motor skill, proprioception and motor activation to allow for proper function of core, proximal hip and LE with self care and ADLs  [] (61015) Gait Re-education- Provided training and instruction to the patient for proper LE, core and proximal hip recruitment and positioning and eccentric body weight control with ambulation re-education including up and down stairs     Home Exercise Program:    [x] (70530) Reviewed/Progressed HEP activities related to strengthening, flexibility, endurance, ROM of core, proximal hip and LE for functional self-care, mobility, lifting and ambulation/stair navigation   [] (21021)Reviewed/Progressed HEP activities related to improving balance, coordination, kinesthetic sense, posture, motor skill, proprioception of core, proximal hip and LE for self care, mobility, lifting, and ambulation/stair navigation      Manual Treatments:  PROM / STM / Oscillations-Mobs:  G-I, II, III, IV (PA's, Inf., Post.)  [] (77055) Provided manual therapy to mobilize LE, proximal hip and/or LS spine soft tissue/joints for the purpose of modulating frida  [] Patient limited by pain  [] Patient limited by other medical complications  [x] Other:  5/28/19:  present. Need to continue to increase resistance to PRES to increase LE strength. Increase in symptoms with HEP appear to be d/t muscle fatigue placing increase stress at medial knee. Pt demonstrated appropriate form with exercises today and did not experience symptoms. Patient education: 5/14 Reviewed diagnosis, POC, HEP and its importance. Prognosis: [x] Good for established goals  [] Fair  [] Poor    Patient Requires Follow-up: [x] Yes  [] No    PLAN: 1-2 days per week for 4 Weeks:5/14-6/14    [x] Continue per plan of care [] Alter current plan (see comments)  [] Plan of care initiated [] Hold pending MD visit [] Discharge    Electronically signed by: Lois Matias PT,     *If patient does not return for further follow ups after this date. Please consider this as the patients discharge from physical therapy.

## 2019-05-30 ENCOUNTER — APPOINTMENT (OUTPATIENT)
Dept: PHYSICAL THERAPY | Age: 74
End: 2019-05-30
Payer: MEDICAID

## 2019-05-30 ENCOUNTER — HOSPITAL ENCOUNTER (OUTPATIENT)
Dept: PHYSICAL THERAPY | Age: 74
Setting detail: THERAPIES SERIES
Discharge: HOME | End: 2019-05-30
Payer: MEDICAID

## 2019-05-30 PROCEDURE — 97110 THERAPEUTIC EXERCISES: CPT | Performed by: PHYSICAL THERAPIST

## 2019-05-30 NOTE — FLOWSHEET NOTE
Special Test Results/Comment   Meniscal Click     Crepitus Mild B 5/14            Reflexes/Sensation:               [x]Dermatomes/Myotomes intact 5/14              []Reflexes equal and normal bilaterally              []Other:     Joint mobility: Not assessed 5/14              []Normal               []Hypo              []Hyper     Palpation: not assessed 5/14     Functional Mobility/Transfers: grosslu WFL with mild incrased timing but grossly WFL for 68 yr old male 5/14     Posture: grossly WFL for 68 yr old male 5/14     Bandages/Dressings/Incisions: NA     Gait: (include devices/WB status) grossly WFL for short distances noted in clinic 5/14                          [x] Patient history, allergies, meds reviewed. Medical chart reviewed. See intake form. 5/14     Review Of Systems (ROS):  [x]Performed Review of systems (Integumentary, CardioPulmonary, Neurological) by intake and observation. Intake form has been scanned into medical record. Patient has been instructed to contact their primary care physician regarding ROS issues if not already being addressed at this time.  5/14          RESTRICTIONS/PRECAUTIONS:     Exercises/Interventions:   Exercise/Equipment Resistance/Repetitions Other comments   recum bike  5 min start5/23   Stretching     Hamstring 15xskl0    Towel Pull 90creu2 ^5/28 slantboard   Inclined Calf     Hip Flexion     ITB     Groin     Quad                    SLR     Supine 3x10 3# ^5/30   Abduction 3x10 3# ^5/30   Adduction     SAQ  Stopped due to crepitus   clams 3x10 light blue loop SL   Increased 5/30  start5/23   LAQ 5\"h2x10 2lbs  Increased 5/30   Isometrics     Quad sets    BS for adduc    Patellar Glides     Medial     Superior     Inferior          ROM     Sheet Pulls     Hang Weights     Passive     Active     Weight Shift     Ankle Pumps                    CKC     Calf raises 3x10  start5/16   Sit to stand 2x10 no UE start5/21   Side Step  10 laps light blue loop at table ^5/30   1 leg stand     Squatting     CC TKE     Balance     bridges 5\"h 3x10 with BS  ^5/28        PRE     Extension  RANGE:   Flexion Standing 2lbs 3x10 RANGE: 0-90 added 5/30        Quantum machines     Leg press      Leg extension     Leg curl          Manual interventions                     Therapeutic Exercise and NMR EXR  [x] (19659) Provided verbal/tactile cueing for activities related to strengthening, flexibility, endurance, ROM for improvements in LE, proximal hip, and core control with self care, mobility, lifting, ambulation.  [] (97492) Provided verbal/tactile cueing for activities related to improving balance, coordination, kinesthetic sense, posture, motor skill, proprioception  to assist with LE, proximal hip, and core control in self care, mobility, lifting, ambulation and eccentric single leg control.      NMR and Therapeutic Activities:    [] (26581 or 13717) Provided verbal/tactile cueing for activities related to improving balance, coordination, kinesthetic sense, posture, motor skill, proprioception and motor activation to allow for proper function of core, proximal hip and LE with self care and ADLs  [] (50601) Gait Re-education- Provided training and instruction to the patient for proper LE, core and proximal hip recruitment and positioning and eccentric body weight control with ambulation re-education including up and down stairs     Home Exercise Program:    [x] (85144) Reviewed/Progressed HEP activities related to strengthening, flexibility, endurance, ROM of core, proximal hip and LE for functional self-care, mobility, lifting and ambulation/stair navigation   [] (91337)Reviewed/Progressed HEP activities related to improving balance, coordination, kinesthetic sense, posture, motor skill, proprioception of core, proximal hip and LE for self care, mobility, lifting, and ambulation/stair navigation      Manual Treatments:  PROM / STM / Oscillations-Mobs:  G-I, II, III, IV (PA's, Inf., progression  [] Other:     ASSESSMENT:  See eval    Treatment/Activity Tolerance:  [x] Patient tolerated treatment well [] Patient limited by fatique  [] Patient limited by pain  [] Patient limited by other medical complications  [x] Other:  5/30/19:  present. No complaints with today's program.  HEP was updated/reviewed. Progressed resistance program today without problem. Requires continued focus on strength. Patient education: 5/14 Reviewed diagnosis, POC, HEP and its importance. Prognosis: [x] Good for established goals  [] Fair  [] Poor    Patient Requires Follow-up: [x] Yes  [] No    PLAN: 1-2 days per week for 4 Weeks:5/14-6/14    [x] Continue per plan of care [] Alter current plan (see comments)  [] Plan of care initiated [] Hold pending MD visit [] Discharge    Electronically signed by: Princess Lyles PT    *If patient does not return for further follow ups after this date. Please consider this as the patients discharge from physical therapy.

## 2019-06-04 ENCOUNTER — HOSPITAL ENCOUNTER (OUTPATIENT)
Dept: PHYSICAL THERAPY | Age: 74
Setting detail: THERAPIES SERIES
Discharge: HOME | End: 2019-06-04
Payer: MEDICAID

## 2021-01-05 ENCOUNTER — OFFICE VISIT (OUTPATIENT)
Dept: PRIMARY CARE CLINIC | Age: 76
End: 2021-01-05

## 2021-01-05 DIAGNOSIS — Z20.828 EXPOSURE TO SARS-ASSOCIATED CORONAVIRUS: Primary | ICD-10-CM

## 2021-01-05 NOTE — PROGRESS NOTES
NAVOS received a viral test for COVID-19. They were educated on isolation and quarantine as appropriate. For any symptoms, they were directed to seek care from their PCP, given contact information to establish with a doctor, directed to an urgent care or the emergency room.

## 2021-01-05 NOTE — PATIENT INSTRUCTIONS

## 2021-01-06 LAB — SARS-COV-2, NAA: DETECTED

## 2021-01-07 ENCOUNTER — TELEPHONE (OUTPATIENT)
Dept: PRIMARY CARE CLINIC | Age: 76
End: 2021-01-07

## 2021-01-07 NOTE — TELEPHONE ENCOUNTER
Pt's daughter would like to ask few questions about her fathers health and how can she take care of him during his recovering.

## 2021-01-08 ENCOUNTER — OFFICE VISIT (OUTPATIENT)
Dept: PRIMARY CARE CLINIC | Age: 76
End: 2021-01-08
Payer: MEDICARE

## 2021-01-08 DIAGNOSIS — F51.01 PRIMARY INSOMNIA: ICD-10-CM

## 2021-01-08 DIAGNOSIS — U07.1 COVID-19: Primary | ICD-10-CM

## 2021-01-08 PROCEDURE — 1123F ACP DISCUSS/DSCN MKR DOCD: CPT | Performed by: FAMILY MEDICINE

## 2021-01-08 PROCEDURE — 3017F COLORECTAL CA SCREEN DOC REV: CPT | Performed by: FAMILY MEDICINE

## 2021-01-08 PROCEDURE — G8427 DOCREV CUR MEDS BY ELIG CLIN: HCPCS | Performed by: FAMILY MEDICINE

## 2021-01-08 PROCEDURE — 4040F PNEUMOC VAC/ADMIN/RCVD: CPT | Performed by: FAMILY MEDICINE

## 2021-01-08 PROCEDURE — G8421 BMI NOT CALCULATED: HCPCS | Performed by: FAMILY MEDICINE

## 2021-01-08 PROCEDURE — G8484 FLU IMMUNIZE NO ADMIN: HCPCS | Performed by: FAMILY MEDICINE

## 2021-01-08 PROCEDURE — 99213 OFFICE O/P EST LOW 20 MIN: CPT | Performed by: FAMILY MEDICINE

## 2021-01-08 PROCEDURE — 1036F TOBACCO NON-USER: CPT | Performed by: FAMILY MEDICINE

## 2021-01-08 ASSESSMENT — ENCOUNTER SYMPTOMS
GASTROINTESTINAL NEGATIVE: 1
COUGH: 1
SORE THROAT: 1
EYES NEGATIVE: 1

## 2021-01-08 NOTE — PROGRESS NOTES
2021    TELEHEALTH EVALUATION -- Audio/Visual (During [facility]-03 public health emergency)    HPI:    Ricarda Cooley (:  1945) has requested an audio/video evaluation for the following concern(s):    Upper Respiratory Infection: Patient complains of symptoms of a URI. Symptoms include congestion, cough, irritability, sore throat and fatigued. Onset of symptoms was a few days ago, gradually improving since that time. He also c/o coryza, lightheadedness, nasal congestion, sore throat, vertigo and chills for the past 7 days . He is drinking moderate amounts of fluids. Evaluation to date: none. Treatment to date: oral decongestant, Acetaminophen, NSAID. Tested positive Covid   Daughter was doing the interpretation he does not know he has Covid prior to the daughter she wants to keep it from him because he will be very depressed he started to feel better  Pt is concern about insomnia has been an issue for years I advised the daughter to get him Tylenol PM as a trial for now to see how he does let me know if not better may be hydroxyzine could be an option for him        Review of Systems   Constitutional: Positive for fatigue. HENT: Positive for congestion and sore throat. Eyes: Negative. Respiratory: Positive for cough. Cardiovascular: Negative. Gastrointestinal: Negative. Psychiatric/Behavioral: Positive for sleep disturbance. All other systems reviewed and are negative. Prior to Visit Medications    Medication Sig Taking? Authorizing Provider   clonazePAM (KLONOPIN) 2 MG tablet Take 2 mg by mouth. Yes Historical Provider, MD   Glucosamine Sulfate 500 MG TABS Take by mouth Yes Historical Provider, MD   tamsulosin (FLOMAX) 0.4 MG capsule Take 0.4 mg by mouth Yes Historical Provider, MD   metroNIDAZOLE (METROCREAM) 0.75 % cream Apply topically 2 times daily Apply topically 2 times daily.  Yes Historical Provider, MD   Ivermectin 1 % CREA Apply topically Yes Historical Provider, MD None visible  [] Abnormal -    HENT:   [x] Normocephalic, atraumatic. [] Abnormal   [x] Mouth/Throat: Mucous membranes are moist.     External Ears [] Normal  [] Abnormal-     Neck: [x] No visualized mass     Pulmonary/Chest: [x] Respiratory effort normal.  [] No visualized signs of difficulty breathing or respiratory distress        [] Abnormal-      Musculoskeletal:   [] Normal gait with no signs of ataxia         [] Normal range of motion of neck        [] Abnormal-       Neurological:        [x] No Facial Asymmetry (Cranial nerve 7 motor function) (limited exam to video visit)          [] No gaze palsy        [] Abnormal-         Skin:        [x] No significant exanthematous lesions or discoloration noted on facial skin         [] Abnormal-            Psychiatric:       [x] Normal Affect [] No Hallucinations        [] Abnormal-     Other pertinent observable physical exam findings-     ASSESSMENT/PLAN:   Diagnosis Orders   1. COVID-19     2. Primary insomnia     Discuss with the daughter warnings signs  When she needs immediate medical attention  To monitor his O2 sat  Increase hydration discussed symptomatic treatment  She can try Tylenol PM if not better let me know when he seems getting better gradually    Breann Piña is a 77 y.o. male being evaluated by a Virtual Visit (video visit) encounter to address concerns as mentioned above. A caregiver was present when appropriate. Due to this being a TeleHealth encounter (During [place] [address] public health emergency), evaluation of the following organ systems was limited: Vitals/Constitutional/EENT/Resp/CV/GI//MS/Neuro/Skin/Heme-Lymph-Imm.   Pursuant to the emergency declaration under the [address] [place] [address], [address] [place] [address] authority and the [employer] and [employer] Act, this Virtual Visit was conducted with patient's (and/or legal guardian's) consent, to reduce the patient's risk of exposure to COVID-19 and provide necessary medical care. The patient (and/or legal guardian) has also been advised to contact this office for worsening conditions or problems, and seek emergency medical treatment and/or call 911 if deemed necessary. Patient identification was verified at the start of the visit: Yes    Total time spent on this encounter: Not billed by time    Services were provided through a video synchronous discussion virtually to substitute for in-person clinic visit. Patient and provider were located at their individual homes. --James Wang MD on 1/8/2021 at 2:20 PM    An electronic signature was used to authenticate this note.

## 2021-01-28 ENCOUNTER — TELEPHONE (OUTPATIENT)
Dept: PRIMARY CARE CLINIC | Age: 76
End: 2021-01-28

## 2021-01-28 NOTE — TELEPHONE ENCOUNTER
Pt tested positive on Jan. 5th. Sx are gone except feeling tired and some sore throats. Pt does not have tonsils. Should he get the vaccine.

## 2021-01-28 NOTE — TELEPHONE ENCOUNTER
Pt daughter requesting to speak with  [facility] please call her @ your convenience @ [phone]

## 2024-10-28 ENCOUNTER — OFFICE VISIT (OUTPATIENT)
Dept: PRIMARY CARE CLINIC | Age: 79
End: 2024-10-28

## 2024-10-28 VITALS
BODY MASS INDEX: 25.57 KG/M2 | WEIGHT: 178.6 LBS | HEIGHT: 70 IN | DIASTOLIC BLOOD PRESSURE: 82 MMHG | SYSTOLIC BLOOD PRESSURE: 136 MMHG | HEART RATE: 97 BPM | OXYGEN SATURATION: 98 % | TEMPERATURE: 98.1 F

## 2024-10-28 DIAGNOSIS — Z00.00 ANNUAL PHYSICAL EXAM: Primary | ICD-10-CM

## 2024-10-28 SDOH — ECONOMIC STABILITY: FOOD INSECURITY: WITHIN THE PAST 12 MONTHS, THE FOOD YOU BOUGHT JUST DIDN'T LAST AND YOU DIDN'T HAVE MONEY TO GET MORE.: NEVER TRUE

## 2024-10-28 SDOH — ECONOMIC STABILITY: FOOD INSECURITY: WITHIN THE PAST 12 MONTHS, YOU WORRIED THAT YOUR FOOD WOULD RUN OUT BEFORE YOU GOT MONEY TO BUY MORE.: NEVER TRUE

## 2024-10-28 SDOH — ECONOMIC STABILITY: INCOME INSECURITY: HOW HARD IS IT FOR YOU TO PAY FOR THE VERY BASICS LIKE FOOD, HOUSING, MEDICAL CARE, AND HEATING?: NOT HARD AT ALL

## 2024-10-28 ASSESSMENT — PATIENT HEALTH QUESTIONNAIRE - PHQ9
SUM OF ALL RESPONSES TO PHQ QUESTIONS 1-9: 1
SUM OF ALL RESPONSES TO PHQ9 QUESTIONS 1 & 2: 1
SUM OF ALL RESPONSES TO PHQ QUESTIONS 1-9: 1
SUM OF ALL RESPONSES TO PHQ QUESTIONS 1-9: 1
2. FEELING DOWN, DEPRESSED OR HOPELESS: SEVERAL DAYS
1. LITTLE INTEREST OR PLEASURE IN DOING THINGS: NOT AT ALL
SUM OF ALL RESPONSES TO PHQ QUESTIONS 1-9: 1

## 2024-10-28 ASSESSMENT — ENCOUNTER SYMPTOMS
EYES NEGATIVE: 1
RESPIRATORY NEGATIVE: 1
ALLERGIC/IMMUNOLOGIC NEGATIVE: 1
GASTROINTESTINAL NEGATIVE: 1

## 2024-10-28 NOTE — PROGRESS NOTES
SUBJECTIVE:  Patient ID: Gume Brock is a 79 y.o. y.o. male     HPI   Pt is here for annual physical exam   Pt with concern about cancer in his prostate   Pt was diagnosed with prostate cancer in [place] in the last 2 months,   Was told he had cyst next to the prostate , was given medication multiple of them he did not use them all  As I reviewed them and with the help of the  that when he was taken it was saw palmetto seeds  According to patient did not have any symptoms but he is concerned now  Overall he feeling okay  Periodically takes Klonopin he gets it from [place] to help him sleep  Past Medical History:   Diagnosis Date    Chronic back pain     GERD (gastroesophageal reflux disease)       Past Surgical History:   Procedure Laterality Date    GALLBLADDER SURGERY      HEMORRHOID SURGERY      TONSILLECTOMY      UMBILICAL HERNIA REPAIR       No family history on file.  Social History     Socioeconomic History    Marital status:      Spouse name: None    Number of children: None    Years of education: None    Highest education level: None   Tobacco Use    Smoking status: Never    Smokeless tobacco: Never   Substance and Sexual Activity    Alcohol use: Yes     Social Determinants of Health     Financial Resource Strain: Low Risk  (10/28/2024)    Overall Financial Resource Strain (CARDIA)     Difficulty of Paying Living Expenses: Not hard at all   Food Insecurity: No Food Insecurity (10/28/2024)    Hunger Vital Sign     Worried About Running Out of Food in the Last Year: Never true     Ran Out of Food in the Last Year: Never true   Transportation Needs: Unknown (10/28/2024)    PRAPARE - Transportation     Lack of Transportation (Non-Medical): No   Housing Stability: Unknown (10/28/2024)    Housing Stability Vital Sign     Homeless in the Last Year: No     Current Outpatient Medications   Medication Sig Dispense Refill    clonazePAM (KLONOPIN) 2 MG tablet Take 1 tablet by mouth.

## 2024-10-29 DIAGNOSIS — Z00.00 ANNUAL PHYSICAL EXAM: ICD-10-CM

## 2024-10-29 LAB
ALBUMIN SERPL-MCNC: 3.9 G/DL (ref 3.4–5)
ALP SERPL-CCNC: 98 U/L (ref 40–129)
ALT SERPL-CCNC: 17 U/L (ref 10–40)
ANION GAP SERPL CALCULATED.3IONS-SCNC: 12 MMOL/L (ref 3–16)
AST SERPL-CCNC: 21 U/L (ref 15–37)
BASOPHILS # BLD: 0 K/UL (ref 0–0.2)
BASOPHILS NFR BLD: 0.8 %
BILIRUB DIRECT SERPL-MCNC: 0.5 MG/DL (ref 0–0.3)
BILIRUB INDIRECT SERPL-MCNC: 0.6 MG/DL (ref 0–1)
BILIRUB SERPL-MCNC: 1.1 MG/DL (ref 0–1)
BUN SERPL-MCNC: 13 MG/DL (ref 7–20)
CALCIUM SERPL-MCNC: 9.1 MG/DL (ref 8.3–10.6)
CHLORIDE SERPL-SCNC: 107 MMOL/L (ref 99–110)
CHOLEST SERPL-MCNC: 148 MG/DL (ref 0–199)
CO2 SERPL-SCNC: 23 MMOL/L (ref 21–32)
CREAT SERPL-MCNC: 1.1 MG/DL (ref 0.8–1.3)
DEPRECATED RDW RBC AUTO: 13.3 % (ref 12.4–15.4)
EOSINOPHIL # BLD: 0.2 K/UL (ref 0–0.6)
EOSINOPHIL NFR BLD: 3 %
GFR SERPLBLD CREATININE-BSD FMLA CKD-EPI: 69 ML/MIN/{1.73_M2}
GLUCOSE SERPL-MCNC: 82 MG/DL (ref 70–99)
HCT VFR BLD AUTO: 49 % (ref 40.5–52.5)
HDLC SERPL-MCNC: 55 MG/DL (ref 40–60)
HGB BLD-MCNC: 16.9 G/DL (ref 13.5–17.5)
LDLC SERPL CALC-MCNC: 83 MG/DL
LYMPHOCYTES # BLD: 1.4 K/UL (ref 1–5.1)
LYMPHOCYTES NFR BLD: 25.3 %
MCH RBC QN AUTO: 33.2 PG (ref 26–34)
MCHC RBC AUTO-ENTMCNC: 34.5 G/DL (ref 31–36)
MCV RBC AUTO: 96.2 FL (ref 80–100)
MONOCYTES # BLD: 0.5 K/UL (ref 0–1.3)
MONOCYTES NFR BLD: 8.5 %
NEUTROPHILS # BLD: 3.3 K/UL (ref 1.7–7.7)
NEUTROPHILS NFR BLD: 62.4 %
PLATELET # BLD AUTO: 146 K/UL (ref 135–450)
PMV BLD AUTO: 9.4 FL (ref 5–10.5)
POTASSIUM SERPL-SCNC: 4.2 MMOL/L (ref 3.5–5.1)
PROT SERPL-MCNC: 6.2 G/DL (ref 6.4–8.2)
PSA SERPL DL<=0.01 NG/ML-MCNC: 8.74 NG/ML (ref 0–4)
RBC # BLD AUTO: 5.09 M/UL (ref 4.2–5.9)
SODIUM SERPL-SCNC: 142 MMOL/L (ref 136–145)
TRIGL SERPL-MCNC: 49 MG/DL (ref 0–150)
TSH SERPL DL<=0.005 MIU/L-ACNC: 1.12 UIU/ML (ref 0.27–4.2)
VLDLC SERPL CALC-MCNC: 10 MG/DL
WBC # BLD AUTO: 5.4 K/UL (ref 4–11)

## 2024-11-01 DIAGNOSIS — R97.20 ABNORMAL PSA: Primary | ICD-10-CM
